# Patient Record
Sex: MALE | Race: WHITE | NOT HISPANIC OR LATINO | Employment: FULL TIME | URBAN - METROPOLITAN AREA
[De-identification: names, ages, dates, MRNs, and addresses within clinical notes are randomized per-mention and may not be internally consistent; named-entity substitution may affect disease eponyms.]

---

## 2019-01-03 ENCOUNTER — OFFICE VISIT (OUTPATIENT)
Dept: URGENT CARE | Facility: CLINIC | Age: 29
End: 2019-01-03
Payer: COMMERCIAL

## 2019-01-03 VITALS
HEART RATE: 96 BPM | HEIGHT: 69 IN | BODY MASS INDEX: 26.54 KG/M2 | WEIGHT: 179.2 LBS | TEMPERATURE: 98.2 F | DIASTOLIC BLOOD PRESSURE: 90 MMHG | OXYGEN SATURATION: 98 % | RESPIRATION RATE: 14 BRPM | SYSTOLIC BLOOD PRESSURE: 120 MMHG

## 2019-01-03 DIAGNOSIS — L20.89 OTHER ATOPIC DERMATITIS: ICD-10-CM

## 2019-01-03 PROCEDURE — 99203 OFFICE O/P NEW LOW 30 MIN: CPT | Performed by: NURSE PRACTITIONER

## 2019-01-03 RX ORDER — METHYLPREDNISOLONE 4 MG/1
4 TABLET ORAL DAILY
Qty: 1 KIT | Refills: 0 | Status: SHIPPED | OUTPATIENT
Start: 2019-01-03

## 2019-01-03 ASSESSMENT — ENCOUNTER SYMPTOMS
DOUBLE VISION: 0
BLURRED VISION: 0
FEVER: 0
MUSCULOSKELETAL NEGATIVE: 1
CHILLS: 0
DIARRHEA: 0
STRIDOR: 0
WHEEZING: 0
PALPITATIONS: 0
CONSTIPATION: 0
HEADACHES: 0
ABDOMINAL PAIN: 0
COUGH: 0
DIZZINESS: 0
SORE THROAT: 0
NAUSEA: 0
VOMITING: 0

## 2019-01-03 NOTE — PROGRESS NOTES
"Subjective:   Flako Tan is a 28 y.o. male who presents for Rash (itchi rash all over body atarted x2 days ago)        HPI   Patient with new onset rash to lower arms and bilateral lower legs that started 2 days ago. He states that he has a similar rash last year, which he was never seen for and it resolved on it's own. He states the rash is itchy at times. Denies fever chills, or URI symptoms. He has not tried any remedies for the rash. Denies alleviating or aggravating factors.    Denies hx of allergies, asthma, no new medications, environmental allergens or changes to soaps.  Review of Systems   Constitutional: Negative for chills, fever and malaise/fatigue.   HENT: Negative for congestion, ear discharge, ear pain and sore throat.    Eyes: Negative for blurred vision and double vision.   Respiratory: Negative for cough, wheezing and stridor.    Cardiovascular: Negative for chest pain and palpitations.   Gastrointestinal: Negative for abdominal pain, constipation, diarrhea, nausea and vomiting.   Musculoskeletal: Negative.    Skin: Positive for itching and rash.   Neurological: Negative for dizziness and headaches.   All other systems reviewed and are negative.      PMH:  has no past medical history on file.  MEDS:   Current Outpatient Prescriptions:   •  MethylPREDNISolone (MEDROL DOSEPAK) 4 MG Tablet Therapy Pack, Take 1 Tab by mouth every day., Disp: 1 Kit, Rfl: 0  ALLERGIES: No Known Allergies  SURGHX: History reviewed. No pertinent surgical history.  SOCHX:  reports that he has never smoked. He has never used smokeless tobacco.  FH: Family history was reviewed, no pertinent findings to report     Objective:   /90 (BP Location: Left arm, Patient Position: Sitting, BP Cuff Size: Adult)   Pulse 96   Temp 36.8 °C (98.2 °F)   Resp 14   Ht 1.753 m (5' 9\")   Wt 81.3 kg (179 lb 3.2 oz)   SpO2 98%   BMI 26.46 kg/m²   Physical Exam   Constitutional: He is oriented to person, place, and time. He " appears well-developed and well-nourished.  Non-toxic appearance. He does not have a sickly appearance. He does not appear ill. No distress.   HENT:   Head: Normocephalic.   Eyes: Pupils are equal, round, and reactive to light.   Cardiovascular: Normal rate, regular rhythm and normal heart sounds.    Pulmonary/Chest: Effort normal and breath sounds normal. No respiratory distress. He has no wheezes.   Neurological: He is alert and oriented to person, place, and time.   Skin: Skin is warm. Capillary refill takes less than 2 seconds. Rash noted. Rash is macular. Rash is not papular, not maculopapular, not nodular, not pustular, not vesicular and not urticarial. He is not diaphoretic.   Macular rash with flaking and noted scratch marks to the bilateral forearms and bilateral lower legs.  Area of worsening behind knees.  No vesicles or pustules.   Psychiatric: He has a normal mood and affect. His speech is normal and behavior is normal. Judgment and thought content normal. He is not actively hallucinating. Cognition and memory are normal. He is attentive.   Vitals reviewed.        Assessment/Plan:   Assessment    1. Other atopic dermatitis  - MethylPREDNISolone (MEDROL DOSEPAK) 4 MG Tablet Therapy Pack; Take 1 Tab by mouth every day.  Dispense: 1 Kit; Refill: 0    Apply lotion twice daily to the entire body.    You may use over-the-counter Zyrtec or Claritin daily for relief of symptoms; follow the package instructions for dosing.    Return to office if symptoms worsen or fail to improve.    Differential diagnosis, natural history, supportive care, and indications for immediate follow-up discussed.

## 2019-12-11 ENCOUNTER — HOSPITAL ENCOUNTER (EMERGENCY)
Facility: MEDICAL CENTER | Age: 29
End: 2019-12-11
Attending: EMERGENCY MEDICINE
Payer: COMMERCIAL

## 2019-12-11 VITALS
BODY MASS INDEX: 28.02 KG/M2 | OXYGEN SATURATION: 95 % | SYSTOLIC BLOOD PRESSURE: 132 MMHG | WEIGHT: 189.15 LBS | HEART RATE: 92 BPM | TEMPERATURE: 97.8 F | RESPIRATION RATE: 16 BRPM | DIASTOLIC BLOOD PRESSURE: 89 MMHG | HEIGHT: 69 IN

## 2019-12-11 DIAGNOSIS — S61.411A LACERATION OF RIGHT HAND, FOREIGN BODY PRESENCE UNSPECIFIED, INITIAL ENCOUNTER: ICD-10-CM

## 2019-12-11 PROCEDURE — 304217 HCHG IRRIGATION SYSTEM

## 2019-12-11 PROCEDURE — 99283 EMERGENCY DEPT VISIT LOW MDM: CPT

## 2019-12-11 PROCEDURE — 700101 HCHG RX REV CODE 250: Performed by: EMERGENCY MEDICINE

## 2019-12-11 PROCEDURE — 304999 HCHG REPAIR-SIMPLE/INTERMED LEVEL 1

## 2019-12-11 PROCEDURE — 303747 HCHG EXTRA SUTURE

## 2019-12-11 RX ORDER — LIDOCAINE HYDROCHLORIDE 10 MG/ML
20 INJECTION, SOLUTION INFILTRATION; PERINEURAL ONCE
Status: COMPLETED | OUTPATIENT
Start: 2019-12-11 | End: 2019-12-11

## 2019-12-11 RX ORDER — LIDOCAINE HYDROCHLORIDE AND EPINEPHRINE BITARTRATE 20; .01 MG/ML; MG/ML
10 INJECTION, SOLUTION SUBCUTANEOUS ONCE
Status: DISCONTINUED | OUTPATIENT
Start: 2019-12-11 | End: 2019-12-11 | Stop reason: HOSPADM

## 2019-12-11 RX ADMIN — LIDOCAINE HYDROCHLORIDE 20 ML: 10 INJECTION, SOLUTION INFILTRATION; PERINEURAL at 08:00

## 2019-12-11 SDOH — HEALTH STABILITY: MENTAL HEALTH: HOW MANY STANDARD DRINKS CONTAINING ALCOHOL DO YOU HAVE ON A TYPICAL DAY?: 1 OR 2

## 2019-12-11 SDOH — HEALTH STABILITY: MENTAL HEALTH: HOW OFTEN DO YOU HAVE A DRINK CONTAINING ALCOHOL?: 4 OR MORE TIMES A WEEK

## 2019-12-11 NOTE — ED NOTES
Pt given discharge instructions/home care instructions explained, pt verbalized understanding of instructions given/pt understands the importance of follow up, pt ambulatory to ER [facility].

## 2019-12-11 NOTE — ED TRIAGE NOTES
"Chief Complaint   Patient presents with   • Hand Laceration       28 yo to triage for above complaint. Pt reports knocking very hard on a metal door at 0500 causing a 1 inch laceration to R hand above knuckles, 3/10 aching pain, CMS intact, - blood thinners, no bruising or swelling noted.    Educated on triage process, encourage to inform staff of any changes.     /99   Pulse (!) 101   Temp 36.6 °C (97.8 °F) (Oral)   Resp 16   Ht 1.753 m (5' 9\")   Wt 85.8 kg (189 lb 2.5 oz)   SpO2 95%   BMI 27.93 kg/m²   "

## 2019-12-11 NOTE — DISCHARGE INSTRUCTIONS
Follow-up with primary care, urgent care or emergency department in 48 hours for reevaluation and wound check.  Follow-up with urgent care, emergency department in 7 to 10 days for suture removal.    Keep wound clean, dry and intact.  Cleanse gently with warm water, soap, pat dry.  Avoid soaking wound in water.  Apply antibiotic ointment twice daily.  Keep covered while active or working.    Tylenol or ibuprofen as needed for any discomfort.    Return to the emergency department for pain, redness, swelling, bleeding or other drainage, fever or other new concerns.

## 2019-12-11 NOTE — ED PROVIDER NOTES
"ED Provider Note    CHIEF COMPLAINT  Chief Complaint   Patient presents with   • Hand Laceration       [facility]  Flako Tan Jr. is a 29 y.o. male who presents to the emergency department through triage for right hand laceration.  Patient states he punched a metal door and received a lack over the fourth knuckle.  Adamantly denies punching a face, mouth or teeth.  Bleeding controlled prior to arrival.  Denies significant pain, deformity.  Unknown last tetanus.    REVIEW OF SYSTEMS  See HPI for further details. All other systems are negative.     PAST MEDICAL HISTORY   Denies    SOCIAL HISTORY  Social History     Tobacco Use   • Smoking status: Never Smoker   • Smokeless tobacco: Never Used   Substance and Sexual Activity   • Alcohol use: Yes     Frequency: 4 or more times a week     Drinks per session: 1 or 2   • Drug use: Never   • Sexual activity: Not on file       SURGICAL HISTORY  patient denies any surgical history    CURRENT MEDICATIONS  Home Medications    **Home medications have not yet been reviewed for this encounter**         ALLERGIES  No Known Allergies    PHYSICAL EXAM  VITAL SIGNS: /99   Pulse (!) 101   Temp 36.6 °C (97.8 °F) (Oral)   Resp 16   Ht 1.753 m (5' 9\")   Wt 85.8 kg (189 lb 2.5 oz)   SpO2 95%   BMI 27.93 kg/m²   Pulse ox interpretation: I interpret this pulse ox as normal.  Constitutional: Alert in no apparent distress.  HENT: Normocephalic, atraumatic. Bilateral external ears normal, Nose normal. Moist mucous membranes.    Eyes: Pupils are equal and reactive, Conjunctiva normal.   Neck: Normal range of motion  Cardiovascular: Normal peripheral perfusion.  Thorax & Lungs: Nonlabored respirations.  Skin: Warm, Dry  Musculoskeletal: Good range of motion in all major joints.  No swelling, crepitus or deformity of the right hand patient does have a U-shaped total 2.5 cm subcutaneous laceration over the fourth MCP.  Neurologic: Alert and oriented x4.  Ambulates " independently.  Psychiatric: Affect normal, Judgment normal, Mood normal.       DIAGNOSTIC STUDIES / PROCEDURES      PROCEDURE  LACERATION REPAIR PROCEDURE NOTE  The patient's identification was confirmed and consent was obtained.  This procedure was performed by   Site: Right hand  Sterile procedures observed  Anesthetic used (type and amt): 2.5 cc lidocaine without epinephrine  Suture type/size: 4-0 nylon  Length: 2.5 cm  # of Sutures: 3  Technique: Simple interrupted  Complexity: Simple  Antibx ointment applied  Tetanus updated  Site anesthetized, irrigated with NS, explored without evidence of foreign body, wound well approximated, site covered with dry, sterile dressing. Patient tolerated procedure well without complications. Instructions for care discussed verbally and patient provided with additional written instructions for homecare and f/u.    COURSE & MEDICAL DECISION MAKING  Right hand laceration was repaired as described above after copious irrigation with good hemostasis and approximation.  Antibiotic ointment, nonstick dressing and gauze was placed by myself.  CMS intact distally.  No clinical evidence or pain pattern to suggest fracture.    Patient is stable for discharge at this time, anticipatory guidance and wound care instructions provided, suture removal 7 to 10 days, close follow-up is encouraged, and strict ED return instructions have been detailed. Patient is agreeable to the disposition and plan.    Patient's blood pressure was elevated in the emergency department, and has been referred to primary care for close monitoring.    FINAL IMPRESSION  (S61.799A) Laceration of right hand, foreign body presence unspecified, initial encounter      Electronically signed by: Stephanie Ellis, 12/11/2019 8:03 AM      This dictation was created using voice recognition software. The accuracy of the dictation is limited to the abilities of the software. I expect there may be some errors of grammar  and possibly content. The nursing notes were reviewed and certain aspects of this information were incorporated into this note.